# Patient Record
Sex: MALE | Race: WHITE
[De-identification: names, ages, dates, MRNs, and addresses within clinical notes are randomized per-mention and may not be internally consistent; named-entity substitution may affect disease eponyms.]

---
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
## 2024-02-09 NOTE — RISK ASSESSMENT
R. sided Chest pain/complains of pain/discomfort [0] : 2) Feeling down, depressed, or hopeless: Not at all (0) [FreeTextEntry1] : see scan [[id number]] : 0 [[id number]] : 0

## 2024-03-10 PROBLEM — Z71.82 EXERCISE COUNSELING: Status: RESOLVED | Noted: 2020-02-19 | Resolved: 2022-02-22
